# Patient Record
Sex: MALE | URBAN - METROPOLITAN AREA
[De-identification: names, ages, dates, MRNs, and addresses within clinical notes are randomized per-mention and may not be internally consistent; named-entity substitution may affect disease eponyms.]

---

## 2017-01-14 ENCOUNTER — EMERGENCY (EMERGENCY)
Facility: HOSPITAL | Age: 42
LOS: 1 days | Discharge: HOME | End: 2017-01-14
Attending: EMERGENCY MEDICINE | Admitting: EMERGENCY MEDICINE
Payer: COMMERCIAL

## 2017-01-14 VITALS
OXYGEN SATURATION: 98 % | WEIGHT: 175.05 LBS | DIASTOLIC BLOOD PRESSURE: 83 MMHG | TEMPERATURE: 99 F | RESPIRATION RATE: 18 BRPM | HEART RATE: 85 BPM | SYSTOLIC BLOOD PRESSURE: 136 MMHG

## 2017-01-14 DIAGNOSIS — R21 RASH AND OTHER NONSPECIFIC SKIN ERUPTION: ICD-10-CM

## 2017-01-14 LAB — HIV 1+2 AB+HIV1 P24 AG SERPL QL IA: SIGNIFICANT CHANGE UP

## 2017-01-14 PROCEDURE — 87491 CHLMYD TRACH DNA AMP PROBE: CPT

## 2017-01-14 PROCEDURE — 87389 HIV-1 AG W/HIV-1&-2 AB AG IA: CPT

## 2017-01-14 PROCEDURE — 99284 EMERGENCY DEPT VISIT MOD MDM: CPT

## 2017-01-14 PROCEDURE — 87591 N.GONORRHOEAE DNA AMP PROB: CPT

## 2017-01-14 PROCEDURE — 86696 HERPES SIMPLEX TYPE 2 TEST: CPT

## 2017-01-14 PROCEDURE — 86780 TREPONEMA PALLIDUM: CPT

## 2017-01-14 PROCEDURE — 99283 EMERGENCY DEPT VISIT LOW MDM: CPT

## 2017-01-14 PROCEDURE — 86695 HERPES SIMPLEX TYPE 1 TEST: CPT

## 2017-01-14 NOTE — ED PROVIDER NOTE - MEDICAL DECISION MAKING DETAILS
Likely fungal skin rash to glans penis, which is uncircumcised.  No urethral discharge or dysuria, do not suspect chlamydia/gonorrhea.  Pt requesting testing for any STDs.  Will get nonemergent testing, and will treat with topical antifungal.

## 2017-01-14 NOTE — ED PROVIDER NOTE - GENITOURINARY, MLM
Uncircumcised.  Multiple ring-like, flesh colored raised macules over glans penis, ranging from 1-4 mm in diameter.  No urethral discharge.  No testicular swelling or tenderness.  No tenderness of perineum.

## 2017-01-14 NOTE — ED ADULT TRIAGE NOTE - CHIEF COMPLAINT QUOTE
Patient has white scale like rash on outside of penile area.  Denies itching.  Reports may have been exposed to STD.  Partner not present.

## 2017-01-14 NOTE — ED PROVIDER NOTE - OBJECTIVE STATEMENT
pt reports rash to glans penis x 2 weeks.  initially red and raw, but never very painful.  no vesicles or pustules.  no urethral discahrge or dysuria.  no testicular pain or  swelling.  no sore throat or discharge from eyes.  no abdominal pain or rectal pain/lesions.  sexually active with more than 1 female partner.  denies hx of STDs.

## 2017-01-16 LAB
HSV1 IGG SER-ACNC: 45.7 INDEX — HIGH
HSV1 IGG SERPL QL IA: POSITIVE
HSV2 IGG FLD-ACNC: 0.12 INDEX — SIGNIFICANT CHANGE UP
HSV2 IGG SERPL QL IA: NEGATIVE — SIGNIFICANT CHANGE UP
T PALLIDUM AB TITR SER: NEGATIVE — SIGNIFICANT CHANGE UP

## 2017-01-17 LAB
C TRACH RRNA SPEC QL NAA+PROBE: SIGNIFICANT CHANGE UP
C TRACH RRNA SPEC QL NAA+PROBE: SIGNIFICANT CHANGE UP
GC AMPLIFICATION INTERPRETATION: SIGNIFICANT CHANGE UP
HSV1 AB FLD QL: NEGATIVE — SIGNIFICANT CHANGE UP
N GONORRHOEA RRNA SPEC QL NAA+PROBE: SIGNIFICANT CHANGE UP
SPECIMEN SOURCE: SIGNIFICANT CHANGE UP
